# Patient Record
Sex: MALE | Race: WHITE | Employment: FULL TIME | URBAN - METROPOLITAN AREA
[De-identification: names, ages, dates, MRNs, and addresses within clinical notes are randomized per-mention and may not be internally consistent; named-entity substitution may affect disease eponyms.]

---

## 2018-05-30 ENCOUNTER — OFFICE VISIT (OUTPATIENT)
Dept: CARDIOLOGY CLINIC | Age: 53
End: 2018-05-30

## 2018-05-30 VITALS
BODY MASS INDEX: 24.11 KG/M2 | HEIGHT: 72 IN | SYSTOLIC BLOOD PRESSURE: 120 MMHG | RESPIRATION RATE: 16 BRPM | HEART RATE: 56 BPM | DIASTOLIC BLOOD PRESSURE: 70 MMHG | WEIGHT: 178 LBS

## 2018-05-30 DIAGNOSIS — R01.1 SYSTOLIC EJECTION MURMUR: ICD-10-CM

## 2018-05-30 DIAGNOSIS — R07.9 CHEST PAIN, UNSPECIFIED TYPE: Primary | ICD-10-CM

## 2018-05-30 DIAGNOSIS — R00.2 PALPITATIONS: ICD-10-CM

## 2018-05-30 DIAGNOSIS — R06.02 SHORTNESS OF BREATH: ICD-10-CM

## 2018-05-30 RX ORDER — TADALAFIL 20 MG/1
10 TABLET ORAL AS NEEDED
COMMUNITY

## 2018-05-30 NOTE — MR AVS SNAPSHOT
[address] [facility] 
[phone] Patient: Maury Vogel MRN: [id number] ELMO:0/4/7643 Visit Information Date & Time Provider Department Dept. Phone Encounter #  
 5/30/2018 10:40 AM Stefan Zuniga MD [facility] Brendan Murcia [phone] [id number] Your Appointments 6/6/2018  3:00 PM  
ECHO CARDIOGRAMS 2D with ECHO, [facility] (KATHY SCHEDULING) Appt Note: echo for murmur, palpitations per Dr. Baumann Degree [address] [employer]  [address] [place]  
[address] [address] Upcoming Health Maintenance Date Due DTaP/Tdap/Td series (1 - Tdap) 9/3/1988 FOBT Q 1 YEAR AGE 50-75 9/3/2017 Influenza Age 5 to Adult 8/1/2018 Allergies as of 5/30/2018  Review Complete On: 5/30/2018 By: Pierce Peña Severity Noted Reaction Type Reactions Penicillins  05/30/2018    Hives Phenobarbital  05/30/2018    Hives Current Immunizations  Never Reviewed No immunizations on file. Not reviewed this visit You Were Diagnosed With   
  
 Codes Comments Chest pain, unspecified type    -  Primary ICD-10-CM: R07.9 ICD-9-CM: 786.50 Shortness of breath     ICD-10-CM: R06.02 
ICD-9-CM: 786.05 Palpitations     ICD-10-CM: R00.2 ICD-9-CM: 785.1 Systolic ejection murmur     ICD-10-CM: R01.1 ICD-9-CM: 926. 2 Vitals BP Pulse Resp Height(growth percentile) Weight(growth percentile) BMI  
 120/70 (BP 1 Location: Right arm, BP Patient Position: Sitting) (!) 56 16 6' (1.829 m) 178 lb (80.7 kg) 24.14 kg/m2 Smoking Status Never Smoker Vitals History BMI and BSA Data Body Mass Index Body Surface Area  
 24.14 kg/m 2 2.02 m 2 Your Updated Medication List  
  
   
 This list is accurate as of 5/30/18 11:30 AM.  Always use your most recent med list.  
  
  
  
  
 CIALIS 20 mg tablet Generic drug:  tadalafil Take 10 mg by mouth as needed. We Performed the Following 2D ECHO COMPLETE ADULT (TTE) W OR WO CONTR [45979 CPT(R)] AMB POC EKG ROUTINE W/ 12 LEADS, INTER & REP [23008 CPT(R)] Introducing [facility]! [employer] introduces TimePoints patient portal. Now you can access parts of your medical record, email your doctor's office, and request medication refills online. 1. In your internet browser, go to [email or web address] 2. Click on the First Time User? Click Here link in the Sign In box. You will see the New Member Sign Up page. 3. Enter your TimePoints Access Code exactly as it appears below. You will not need to use this code after youve completed the sign-up process. If you do not sign up before the expiration date, you must request a new code. · TimePoints Access Code: [email or web address] Expires: 8/28/2018  8:48 AM 
 
4. Enter the last four digits of your Social Security Number (xxxx) and Date of Birth (mm/dd/yyyy) as indicated and click Submit. You will be taken to the next sign-up page. 5. Create a TimePoints ID. This will be your TimePoints login ID and cannot be changed, so think of one that is secure and easy to remember. 6. Create a TimePoints password. You can change your password at any time. 7. Enter your Password Reset Question and Answer. This can be used at a later time if you forget your password. 8. Enter your e-mail address. You will receive e-mail notification when new information is available in [address] 9. Click Sign Up. You can now view and download portions of your medical record. 10. Click the Download Summary menu link to download a portable copy of your medical information.  
 
If you have questions, please visit the Frequently Asked Questions section of the [employer]. Remember, [employer] is NOT to be used for urgent needs. For medical emergencies, dial 911. Now available from your iPhone and Android! Please provide this summary of care documentation to your next provider. Your primary care clinician is listed as Jose Roger. If you have any questions after today's visit, please call [phone].

## 2018-05-30 NOTE — PROGRESS NOTES
[facility]  [phone]    Reason for consult: palpitations  Requesting physician: Dr. Alice Candelario    HPI: Fabio Mortensen, a 50y.o. year-old who presents for evaluation of palpitations. He has been having palpitations which have become worse in the last year  He believes that they are related to stress at work, has more frequent episodes when stressed at work   Says when he took a few weeks off work around the winter holidays he did not have any palpitations  They can occur a few times/day or sometimes once every 2-3 days, sometimes he feels like he has to cough with it, no other symptoms associated with episodes  He sleeps 6-7 hours/night, drinks maybe one beer/week, drinks one diet coke/day   Has some mild chest tightness at work which he attributes to stress  He runs, bikes and plays tennis, rode 55 miles on Monday without any symptoms  2-3 times he had some chest tightness and dyspnea while running which resolve with slowing to a walk, when he resumed running a few minutes later his chest tightness and dyspnea did not return  He has never had a stress test, had an echo 10 years ago for murmur, none since  He reports slightly lower BP but no dizziness or syncope  Reports that his heart rate has been about 50bpm his entire life  He reports LE edema, left > right, he says the more he exercises the better it is    Assessment/Plan:  1. Palpitations - no high risk features, likely PACs or PVCs related to stress, discussed doing loop monitor to assess for arrhythmias like Atrial Fib but he doesn't have any high risk features, he is low risk for CVA even if it is AFib so we will leave it alone for now, discussed symptoms for which he should call the office for further evaluation, if palpitations worsen advised him to call the office to set up a loop monitor to assess   -TSH and K normal on last set of labs  -follow up PRN  2.   Systolic ejection murmur - will order TTE to assess cardiac structure and function  3. ED - takes cialis PRN, rarely   4. LE edema - he wears compression socks, will assess cardiac structure and function with TTE, likely varicose veins, discussed seeing a vein specialist if symptoms persist    Fam Hx: father had pacemaker in his [de-identified], passed from pneumonia, mother passed away from Alzheimer's at age 80, niece had SVT ablation  Soc Hx: no tobacco use, very rare etoh use, one diet coke/daily     He  has no past medical history on file. Cardiovascular ROS: positive for palpitations, chest tightness  Respiratory ROS: no cough, shortness of breath, or wheezing  Neurological ROS: no TIA or stroke symptoms  All other systems negative except as above. PE  Vitals:    05/30/18 1043   BP: 120/70   Pulse: (!) 56   Resp: 16   Weight: 178 lb (80.7 kg)   Height: 6' (1.829 m)    Body mass index is 24.14 kg/(m^2).    General appearance - alert, well appearing, and in no distress  Mental status - affect appropriate to mood  Eyes - sclera anicteric, moist mucous membranes  Neck - supple  Lymphatics - not assessed  Chest - clear to auscultation, no wheezes, rales or rhonchi  Heart - bradycardia, regular rhythm, normal S1, S2, no murmurs, rubs, clicks or gallops  Abdomen - soft, nontender, nondistended, no masses or organomegaly  Back exam - full range of motion, no tenderness  Neurological - cranial nerves II through XII grossly intact, no focal deficit  Musculoskeletal - no muscular tenderness noted, normal strength  Extremities - peripheral pulses normal, no pedal edema  Skin - normal coloration  no rashes    12 lead ECG: sinus bradycardia     Recent Labs:  No results found for: CHOL, CHOLX, CHLST, CHOLV, 568844, HDL, LDL, LDLC, DLDLP, TGLX, TRIGL, TRIGP, CHHD, CHHDX  No results found for: CINTHYA, ACREA, CREA, REFC3, REFC4  No results found for: BUN, IBUN, MBUNV, BUNV  No results found for: K, KI, PLK, WBK  No results found for: HBA1C, HGBE8, JNV7VIEU, RQD1YGCD  No results found for: HGB, HGBP, HGBEXT, HGBEXT  No results found for: PLT, PLTEXT, PLTEXT    Reviewed:  No past medical history on file. History   Smoking Status    Never Smoker   Smokeless Tobacco    Never Used     History   Alcohol Use    Yes     Comment: 3-4 times a year     Allergies   Allergen Reactions    Penicillins Hives    Phenobarbital Hives       Current Outpatient Prescriptions   Medication Sig    tadalafil (CIALIS) 20 mg tablet Take 10 mg by mouth as needed. No current facility-administered medications for this visit.         John Castorena MD  [facility] [address] [place] [address] [address], [address] [address]  [facility]  [phone]

## 2018-06-06 ENCOUNTER — CLINICAL SUPPORT (OUTPATIENT)
Dept: CARDIOLOGY CLINIC | Age: 53
End: 2018-06-06

## 2018-06-06 DIAGNOSIS — R01.1 MURMUR: Primary | ICD-10-CM

## 2018-06-08 ENCOUNTER — TELEPHONE (OUTPATIENT)
Dept: CARDIOLOGY CLINIC | Age: 53
End: 2018-06-08

## 2020-08-25 ENCOUNTER — TELEPHONE (OUTPATIENT)
Dept: CARDIOLOGY CLINIC | Age: 55
End: 2020-08-25

## 2020-08-25 LAB — SARS-COV-2 AB, IGG, CORG1M: NEGATIVE

## 2020-08-26 DIAGNOSIS — Z01.818 PRE-OP TESTING: Primary | ICD-10-CM

## 2020-08-26 DIAGNOSIS — R07.89 OTHER CHEST PAIN: ICD-10-CM

## 2020-08-27 ENCOUNTER — DOCUMENTATION ONLY (OUTPATIENT)
Dept: CARDIOLOGY CLINIC | Age: 55
End: 2020-08-27

## 2020-08-27 ENCOUNTER — TELEPHONE (OUTPATIENT)
Dept: CARDIOLOGY CLINIC | Age: 55
End: 2020-08-27

## 2020-08-27 NOTE — TELEPHONE ENCOUNTER
8/27/2020 at 9:55 left message mobile call to schedule IPV or VV with Dr. Lisa Ledbetter after stress echo on 9/17/2020

## 2020-08-27 NOTE — TELEPHONE ENCOUNTER
----- Message from Vitaliy Matthew NP sent at 8/27/2020  9:35 AM EDT -----  Regarding: needs appt  Per Dr. Lindsay De La Garza, please set him up for either an IPV or a VV with her after his stress echo  Please call him to discuss    [facility]

## 2020-08-27 NOTE — TELEPHONE ENCOUNTER
8/27/2020 at 4:10 spoke with patient VV scheduled     Future Appointments   Date Time Provider Ho Perla   9/17/2020  9:00 AM DONA AVILES AMB   9/22/2020  1:20 PM MD TOBIN Coronel AMB

## 2020-09-13 DIAGNOSIS — Z01.818 PRE-OP TESTING: ICD-10-CM

## 2020-09-14 ENCOUNTER — HOSPITAL ENCOUNTER (OUTPATIENT)
Dept: PREADMISSION TESTING | Age: 55
Discharge: HOME | End: 2020-09-14
Payer: COMMERCIAL

## 2020-09-14 DIAGNOSIS — Z01.812 PRE-PROCEDURE LAB EXAM: ICD-10-CM

## 2020-09-14 PROCEDURE — 87635 SARS-COV-2 COVID-19 AMP PRB: CPT

## 2020-09-15 LAB — SARS-COV-2, COV2NT: NOT DETECTED

## 2020-09-17 ENCOUNTER — APPOINTMENT (OUTPATIENT)
Dept: CARDIOLOGY CLINIC | Age: 55
End: 2020-09-17

## 2020-09-17 ENCOUNTER — ANCILLARY PROCEDURE (OUTPATIENT)
Dept: CARDIOLOGY CLINIC | Age: 55
End: 2020-09-17
Payer: COMMERCIAL

## 2020-09-17 VITALS
BODY MASS INDEX: 24.11 KG/M2 | DIASTOLIC BLOOD PRESSURE: 60 MMHG | HEIGHT: 72 IN | WEIGHT: 178 LBS | SYSTOLIC BLOOD PRESSURE: 110 MMHG

## 2020-09-17 DIAGNOSIS — R07.89 OTHER CHEST PAIN: ICD-10-CM

## 2020-09-17 LAB
ECHO AV MEAN GRADIENT: 6.65 MMHG
ECHO AV PEAK GRADIENT: 11.53 MMHG
ECHO AV PEAK VELOCITY: 169.77 CM/S
ECHO AV VTI: 35.95 CM
STRESS ANGINA INDEX: 0
STRESS BASELINE DIAS BP: 60 MMHG
STRESS BASELINE HR: 63 BPM
STRESS BASELINE SYS BP: 110 MMHG
STRESS ESTIMATED WORKLOAD: 17.2 METS
STRESS EXERCISE DUR MIN: NORMAL MIN:SEC
STRESS O2 SAT PEAK: 95 %
STRESS PEAK DIAS BP: 80 MMHG
STRESS PEAK SYS BP: 180 MMHG
STRESS PERCENT HR ACHIEVED: 105 %
STRESS POST PEAK HR: 176 BPM
STRESS RATE PRESSURE PRODUCT: NORMAL BPM*MMHG
STRESS SR DUKE TREADMILL SCORE: 14
STRESS ST DEPRESSION: 0 MM
STRESS ST ELEVATION: 0 MM
STRESS TARGET HR: 167 BPM

## 2020-09-17 PROCEDURE — 93351 STRESS TTE COMPLETE: CPT | Performed by: INTERNAL MEDICINE

## 2020-09-18 NOTE — PROGRESS NOTES
Patient has an upcoming appointment  Future Appointments  9/22/2020  1:20 PM    Severo Barlow, MD C[facility] BS AMB

## 2020-09-22 ENCOUNTER — VIRTUAL VISIT (OUTPATIENT)
Dept: CARDIOLOGY CLINIC | Age: 55
End: 2020-09-22
Payer: COMMERCIAL

## 2020-09-22 DIAGNOSIS — R07.9 CHEST PAIN, UNSPECIFIED TYPE: ICD-10-CM

## 2020-09-22 DIAGNOSIS — R00.2 PALPITATIONS: Primary | ICD-10-CM

## 2020-09-22 PROCEDURE — 99214 OFFICE O/P EST MOD 30 MIN: CPT | Performed by: INTERNAL MEDICINE

## 2020-09-22 NOTE — Clinical Note
Can you mail him a calcium scoring flyer and get his labs from dr duque?  Probalby around may, thanks

## 2020-09-22 NOTE — PROGRESS NOTES
[facility]  [phone]    HPI: Shelby Stewart, a 50y.o. year-old who presents for evaluation of palpitations. Shelby Stewart who was evaluated through a synchronous (real-time) audio-video encounter, and/or her healthcare decision maker, is aware that it is a billable service, with coverage as determined by her insurance carrier. She provided verbal consent to proceed: Yes, and patient identification was verified. It was conducted pursuant to the emergency declaration under the [facility], [address] [place] [address] authority and the Between Act. A caregiver was present when appropriate. Ability to conduct physical exam was limited. I was at the office. The patient was at home. Last OV in 2018  Records from Dr. Edwin Jimenez reviewed   Patient c/o chest pressure,  stress echo 9/17/20  ECG 8/25/20 - SB 52 bpm  Has sensation of something in his upper throat/chest after exercising, not consistent    Did see Dr. Carine Licea, had venous reflux and ablation which has helped with the le swelling. Had a knee problem and went to PT and that helped a lot with stretches and exercises and stretches have helped him. He was last seen by me in 2018 for palpitations that seem to be stress related his evaluation at that time was unrevealing and his symptoms settle down. More recently a couple of weeks ago he started having this sensation of something in his throat and chest after exercising he had at one time in particular after a 50+ mile bike ride sort of aching and sore in the chest and throat that prompted him to return for evaluation and stress testing to look for signs of coronary disease. Since then he has continued to exercise regularly and is done more strenuous rides including once over 30 miles without any recurrence of the chest symptoms.     We reviewed his stress test which did reveal ectopy during exertion some PVCs and a very slight apical wall motion abnormality that may be artifactual at an exercise time of over 14 minutes. So overall this is a low risk stress test and combined with the resolution of his symptoms we will watch for now rather than proceeding with further invasive testing. 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 No dizziness lightheadedness syncope near syncope. He is having a little bit more swelling after sitting all day at the computer but otherwise it goes away when he gets up and moves around or when he goes to bed and wakes up. Reports that his heart rate has been about 50bpm his entire life, we did review bradycardia sick sinus syndrome and when to worry however he has no concerning features of dizziness or fatigue. He reports LE edema, left > right, he says the more he exercises the better it is    We also discussed and I recommended he proceed with calcium scoring just to evaluate and further risk stratify his dyslipidemia as he is not currently being treated for that with medicine and is not excited to begin a statin nor is it clear that it is indicated right now. He will follow-up as needed for now should symptoms change    Assessment/Plan:  1. Palpitations - no high risk features, likely PACs or PVCs related to stress, discussed doing loop monitor to assess for arrhythmias like Atrial Fib but he doesn't have any high risk features, he is low risk for CVA even if it is AFib so we will leave it alone for now, discussed symptoms for which he should call the office for further evaluation, if palpitations worsen advised him to call the office to set up a loop monitor to assess   -TSH and K normal on last set of labs  2. Systolic ejection murmur -echo looks okay  3. ED - takes cialis PRN, rarely   4.   LE edema - he wears compression socks, also improved after his venous ablation by Dr. Deric Puri  5. Chest pain resolved now with low risk stress echo, will watch unless further symptoms develop. Fam Hx: father had pacemaker in his [de-identified], passed from pneumonia, mother passed away from Alzheimer's at age 80, niece had SVT ablation  Soc Hx: no tobacco use, very rare etoh use, one diet coke/daily     He  has no past medical history on file. Cardiovascular ROS: positive for palpitations, chest tightness  Respiratory ROS: no cough, shortness of breath, or wheezing  Neurological ROS: no TIA or stroke symptoms  All other systems negative except as above. PE  There were no vitals filed for this visit. There is no height or weight on file to calculate BMI. General appearance - alert, well appearing, and in no distress  Mental status - affect appropriate to mood  Eyes - sclera anicteric, moist mucous membranes  Chest - clear to auscultation, no wheezes, rales or rhonchi  resp-no wheezing coughing or congestion  Neurological - cranial nerves II through XII grossly intact, no focal deficit  Skin - normal coloration  no rashes    12 lead ECG: sinus bradycardia     Recent Labs:  No results found for: CHOL, CHOLX, CHLST, CHOLV, 036477, HDL, HDLP, LDL, LDLC, DLDLP, TGLX, TRIGL, TRIGP, CHHD, CHHDX  No results found for: CINTHYA, ACREA, CREA, REFC3, REFC4  No results found for: BUN, IBUN, MBUNV, BUNV  No results found for: K, KI, PLK, WBK  No results found for: HBA1C, HGBE8, VTD1VLVU, FKF1OFGQ  No results found for: HGB, HGBP, HGBEXT, HGBEXT  No results found for: PLT, PLTEXT, PLTEXT    Reviewed:  No past medical history on file.   Social History     Tobacco Use   Smoking Status Never Smoker   Smokeless Tobacco Never Used     Social History     Substance and Sexual Activity   Alcohol Use Yes    Comment: 3-4 times a year     Allergies   Allergen Reactions    Penicillins Hives    Phenobarbital Hives       Current Outpatient Medications Medication Sig    tadalafil (CIALIS) 20 mg tablet Take 10 mg by mouth as needed. No current facility-administered medications for this visit.         Maya Gallegos MD  [facility] [address] [place] [address] [address], [address] [place] [address]  [facility]  [phone]

## 2020-09-23 NOTE — PATIENT INSTRUCTIONS
MD Ankit Cage   
  
    
  
Can you mail him a calcium scoring flyer and get his labs from dr duque? Probalby around may, thanks 9/23/2020 Calcium Score Flyer mailed to patients home address and fax request for labs sent to Osvaldo Lobo MD's office [phone].

## 2023-05-18 RX ORDER — TADALAFIL 20 MG/1
10 TABLET ORAL PRN
COMMUNITY